# Patient Record
Sex: MALE | Race: WHITE
[De-identification: names, ages, dates, MRNs, and addresses within clinical notes are randomized per-mention and may not be internally consistent; named-entity substitution may affect disease eponyms.]

---

## 2018-04-20 ENCOUNTER — HOSPITAL ENCOUNTER (OUTPATIENT)
Dept: HOSPITAL 61 - US | Age: 83
Discharge: HOME | End: 2018-04-20
Payer: MEDICARE

## 2018-04-20 DIAGNOSIS — E04.1: Primary | ICD-10-CM

## 2018-04-20 PROCEDURE — 10022: CPT

## 2018-04-20 PROCEDURE — 60300 ASPIR/INJ THYROID CYST: CPT

## 2018-04-20 PROCEDURE — 76942 ECHO GUIDE FOR BIOPSY: CPT

## 2018-10-31 ENCOUNTER — HOSPITAL ENCOUNTER (OUTPATIENT)
Dept: HOSPITAL 61 - SURG | Age: 83
Discharge: HOME | End: 2018-10-31
Attending: DENTIST
Payer: MEDICARE

## 2018-10-31 VITALS — BODY MASS INDEX: 27.3 KG/M2 | HEIGHT: 71 IN | WEIGHT: 195 LBS

## 2018-10-31 VITALS — SYSTOLIC BLOOD PRESSURE: 154 MMHG | DIASTOLIC BLOOD PRESSURE: 68 MMHG

## 2018-10-31 DIAGNOSIS — E03.9: ICD-10-CM

## 2018-10-31 DIAGNOSIS — Z86.73: ICD-10-CM

## 2018-10-31 DIAGNOSIS — M19.90: ICD-10-CM

## 2018-10-31 DIAGNOSIS — Z91.041: ICD-10-CM

## 2018-10-31 DIAGNOSIS — K13.79: Primary | ICD-10-CM

## 2018-10-31 DIAGNOSIS — E11.9: ICD-10-CM

## 2018-10-31 DIAGNOSIS — Z79.899: ICD-10-CM

## 2018-10-31 DIAGNOSIS — G47.33: ICD-10-CM

## 2018-10-31 DIAGNOSIS — Z79.84: ICD-10-CM

## 2018-10-31 DIAGNOSIS — Z72.89: ICD-10-CM

## 2018-10-31 DIAGNOSIS — I10: ICD-10-CM

## 2018-10-31 DIAGNOSIS — N40.0: ICD-10-CM

## 2018-10-31 DIAGNOSIS — Z79.82: ICD-10-CM

## 2018-10-31 PROCEDURE — 42100 BIOPSY ROOF OF MOUTH: CPT

## 2018-10-31 PROCEDURE — 88305 TISSUE EXAM BY PATHOLOGIST: CPT

## 2018-10-31 PROCEDURE — A7015 AEROSOL MASK USED W NEBULIZE: HCPCS

## 2018-10-31 PROCEDURE — 82962 GLUCOSE BLOOD TEST: CPT

## 2018-10-31 NOTE — OP
DATE OF SURGERY:  10/31/2018



OPERATING SURGEON:  Jose Ramon Salinas DMD



OPERATING SERVICE:  Oral and Maxillofacial Surgery.



PREOPERATIVE DIAGNOSIS:  Right soft palate hyperkeratosis.



POSTOPERATIVE DIAGNOSIS:  Right soft palate hyperkeratosis.



PROCEDURES PERFORMED:  Incision and biopsy of hyperkeratotic right palatal

mucosa.



BRIEF HISTORY:  The patient was referred to our clinic by Dr. Sanchez for

evaluation of the right hyperkeratotic lesion on his right soft palate.  It was

approximately 6 x 6 mm in size.  This lesion was located too far posteriorly to

accommodate biopsy of this lesion under local anesthetic given his multiple

comorbidities.  The plan was for this biopsy to occur in the OR under

anesthesia.



A permit was obtained.  The patient was scheduled for surgery.



ESTIMATED BLOOD LOSS:  Less than 5 mL.



DRAINS PLACED:  None.



COMPLICATIONS:  None.



SPECIMEN SENT:  One specimen for permanent evaluation was the right soft palate

mucosa.



DESCRIPTION OF PROCEDURE:  After the history and physical was updated in the

preoperative holding area, the patient was transported by the Anesthesia Service

to the operating suite, placed in the supine position.  General anesthesia was

induced.  An LMA was placed and secured.  Moistened throat pack was placed.  The

surgery began with the administration of approximately 8 mL of 0.5% Marcaine

with 1:200,000 epinephrine.  Time was allowed for the anesthesia to take effect.

 A surgical timeout was performed.



The surgery began with the #15 blade where a mucosal ellipse type incision was

employed to excise the mucosa that was hyperkeratotic in the right soft palate. 

This was then reapproximated with primary closure with multiple interrupted 3-0

chromic gut sutures without complication.



The area was found to be hemostatic.  At this point, the oral cavity was

lavaged.  Moistened throat pack was removed and the patient was then returned to

the care of anesthesia where he was awakened and the LMA was removed without

complication.  The patient was recovered in the OR, transported to the PACU in

stable condition.

 



______________________________

JOSE RAMON SALINAS DMD DR:  MILLER/khang  JOB#:  [id number] / [id number]

DD:  10/31/2018 17:58  DT:  10/31/2018 18:49

## 2018-10-31 NOTE — PDOC4
OPERATIVE NOTE


Date:


Date:  Oct 31, 2018





Pre-Op Diagnosis:


hyperkeratosis right soft palate





Post-Op Diagnosis:


hyperkeratosis right soft palate





Procedure Performed:


biopsy of right soft palate hyperkeratosis





Surgeon:


card





Anesthesia Type:


LAURIE





Blood Loss:


<10ml





Specimans Obtained:


right soft palatal mucosa


permanent specimen





Findings:


see dictation





Complications:


none





Operative Note:


biopsy of hyperkeratosis right soft palate 


closed with 3/0 CG











SAMMY SALINAS DMD Oct 31, 2018 13:09

## 2018-11-02 NOTE — PATHOLOGY
[facility] Accession Number: [id number]

.                                                                01

Material submitted:                                        .

RIGHT HYPERKERATOSIS SOFT PALATE

.                                                                01

Clinical history:                                          .

Hyperkeratosis of right soft palate

.                                                                02

**********************************************************************

Diagnosis:

Soft palate, biopsy:

- Squamous epithelium with hyperkeratosis and parakeratosis.

([facility]:soo; 11/02/2018)

QMS/11/02/2018

**********************************************************************

.                                                                02

Electronically signed:                                     .

Nir Whitman MD, Pathologist

NPI- [id number]

.                                                                01

Gross description:                                         .

Received in formalin labeled "Bucky Pennington, right hyperkeratosis soft

palate," is an irregular segment of epithelial-covered soft tissue

measuring 0.7 x 0.4 x 0.2 cm in greatest dimensions.  The epithelial

surface is irregularly contoured and pale white-tan in appearance.  The

surgical margin is inked, and the specimen is bisected and submitted

entirely in cassette A1.

([facility]; 11/1/2018)

XDC/XDC

.                                                                02

Pathologist provided ICD-10:

L85.8

.                                                                02

CPT                                                        .

118450

Specimen Comment: A courtesy copy of this report has been sent to

Specimen Comment: [phone], [phone].

Specimen Comment: Report sent to  / DR CURRY

***Performed at:  01

   [facility]

   [address], [place]  [id number]

   MD Andrea Eason MD Phone:  [phone]

***Performed at:  02

   [facility]

   [address], [place]  [id number]

   MD Jose Alfredo Astudillo MD Phone:  [phone]

## 2019-07-24 ENCOUNTER — HOSPITAL ENCOUNTER (OUTPATIENT)
Dept: HOSPITAL 61 - ENDOS | Age: 84
End: 2019-07-24
Attending: INTERNAL MEDICINE
Payer: MEDICARE

## 2019-07-24 VITALS — DIASTOLIC BLOOD PRESSURE: 67 MMHG | SYSTOLIC BLOOD PRESSURE: 145 MMHG

## 2019-07-24 DIAGNOSIS — K29.50: Primary | ICD-10-CM

## 2019-07-24 DIAGNOSIS — F15.90: ICD-10-CM

## 2019-07-24 DIAGNOSIS — Z79.82: ICD-10-CM

## 2019-07-24 DIAGNOSIS — Z87.891: ICD-10-CM

## 2019-07-24 DIAGNOSIS — Z85.850: ICD-10-CM

## 2019-07-24 DIAGNOSIS — Z79.84: ICD-10-CM

## 2019-07-24 DIAGNOSIS — Z72.89: ICD-10-CM

## 2019-07-24 DIAGNOSIS — K21.9: ICD-10-CM

## 2019-07-24 DIAGNOSIS — E03.9: ICD-10-CM

## 2019-07-24 DIAGNOSIS — K57.30: ICD-10-CM

## 2019-07-24 DIAGNOSIS — I10: ICD-10-CM

## 2019-07-24 DIAGNOSIS — K64.0: ICD-10-CM

## 2019-07-24 DIAGNOSIS — E11.9: ICD-10-CM

## 2019-07-24 DIAGNOSIS — Z95.1: ICD-10-CM

## 2019-07-24 DIAGNOSIS — E78.00: ICD-10-CM

## 2019-07-24 DIAGNOSIS — D50.0: ICD-10-CM

## 2019-07-24 PROCEDURE — 45378 DIAGNOSTIC COLONOSCOPY: CPT

## 2019-07-24 PROCEDURE — 43235 EGD DIAGNOSTIC BRUSH WASH: CPT

## 2020-11-12 ENCOUNTER — HOSPITAL ENCOUNTER (OUTPATIENT)
Dept: HOSPITAL 63 - LAB | Age: 85
End: 2020-11-12
Payer: MEDICARE

## 2020-11-12 DIAGNOSIS — I10: ICD-10-CM

## 2020-11-12 DIAGNOSIS — I25.10: Primary | ICD-10-CM

## 2020-11-12 PROCEDURE — 80061 LIPID PANEL: CPT

## 2020-11-13 LAB
CHOLEST/HDLC SERPL: 3 {RATIO}
HDLC SERPL-MCNC: 28 MG/DL (ref 40–60)
LDLC: 41 MG/DL (ref 0–100)
TRIGL SERPL-MCNC: 137 MG/DL (ref 0–150)
VLDLC: 27 MG/DL (ref 0–40)